# Patient Record
Sex: FEMALE | Race: BLACK OR AFRICAN AMERICAN | NOT HISPANIC OR LATINO | Employment: FULL TIME
[De-identification: names, ages, dates, MRNs, and addresses within clinical notes are randomized per-mention and may not be internally consistent; named-entity substitution may affect disease eponyms.]

---

## 2023-05-21 ENCOUNTER — HEALTH MAINTENANCE LETTER (OUTPATIENT)
Age: 33
End: 2023-05-21

## 2023-05-26 NOTE — PROGRESS NOTES
Assessment & Plan     Pelvic pain in female  See result note and below  - Wet preparation  - US Pelvic Transabdominal and Transvaginal; Future     Uterine leiomyoma, unspecified location  We reviewed possible etiologies of her pain. Discussed her previous history as best as she is able to describe. Would be beneficial to get previous records from clinic in [place], needs to confirm address of the clinic and will then complete MIKI.   Pelvic ultrasound scheduled for patient while at visit today, plan follow up once results available. Patient is given an opportunity to ask questions and have them answered.  - US Pelvic Transabdominal and Transvaginal; Future    Screening for malignant neoplasm of cervix  - Pap imaged thin layer screen with HPV - recommended age 30 - 65 years (select HPV order below)    Vaginitis and vulvovaginitis  - fluconazole (DIFLUCAN) 150 MG tablet; Take 1 tablet (150 mg) by mouth once for 1 dose Repeat in 1 week  - metroNIDAZOLE (FLAGYL) 500 MG tablet; Take 1 tablet (500 mg) by mouth 2 times daily for 7 days    NANDO Leung [facility] JOHANNA Veloz   Bertha is a 32 year old, presenting for the following health issues:  Pelvic Pain    Visit conducted with a telephone Yi interpretor.    HPI     Pelvic pain    Symptoms present x 1 month. Noted generalized in the lower quadrants R>L. Describes as intermittent with no noted aggravating factors. When present, describes as occasional sharper bouts with mostly dull to throbbing discomfort. Has not tried anything to alleviate the pain.   Denies fever, nausea, abnormal vaginal symptoms or urinary symptoms. No major bowel concerns, some occasional constipation. Does have a history of ovarian cysts in the past. Cycles are regular and last 5 days, last 2 cycles only bled 3 days. No prior pregnancy.  As exam was being done, questioned patient about any history of uterine fibroids as abdomen seemed firm and  "then she relayed that she had a previous large uterine fibroid. Believes it was just 1. Treated with an injection-believes it was Lupron in 2021. Had 5 or 6 monthly injections. This was in [place]. After treatment was put on a daily medication, believes it was an oral contraceptive pill. Has not been on that in over a year now. No records currently available. Unsure on last pap smear.    Review of Systems   Constitutional, HEENT, cardiovascular, pulmonary, gi and gu systems are negative, except as otherwise noted.      Objective    /82 (BP Location: Right arm, Patient Position: Sitting, Cuff Size: Adult Large)   Pulse 76   Ht 1.765 m (5' 9.5\")   Wt 106 kg (233 lb 9.6 oz)   LMP 05/06/2023 (Exact Date)   SpO2 100%   BMI 34.00 kg/m    Body mass index is 34 kg/m .  Physical Exam   GENERAL: healthy, alert and no distress  ABDOMEN: palpation of the abdomen is somewhat firm below the umbilicus. Tender with palpation across the lower abdomen, no guarding rigidity, rebound tenderness.   (female): normal female external genitalia, normal urethral meatus, vaginal mucosa pink and normal appearing cervix. Uterine enlargement to approximately 16-18 weeks size. Bilateral adnexal tenderness  MS: no gross musculoskeletal defects noted, no edema  SKIN: no suspicious lesions or rashes  PSYCH: mentation appears normal, affect normal/bright    Results for orders placed or performed in visit on 05/30/23 (from the past 24 hour(s))   Wet preparation    Specimen: Vagina; Swab   Result Value Ref Range    Trichomonas Absent Absent    Yeast Present (A) Absent    Clue Cells Present (A) Absent    WBCs/high power field 2+ (A) None           "

## 2023-05-30 ENCOUNTER — OFFICE VISIT (OUTPATIENT)
Dept: OBGYN | Facility: CLINIC | Age: 33
End: 2023-05-30
Payer: COMMERCIAL

## 2023-05-30 VITALS
DIASTOLIC BLOOD PRESSURE: 82 MMHG | OXYGEN SATURATION: 100 % | WEIGHT: 233.6 LBS | SYSTOLIC BLOOD PRESSURE: 126 MMHG | BODY MASS INDEX: 33.44 KG/M2 | HEART RATE: 76 BPM | HEIGHT: 70 IN

## 2023-05-30 DIAGNOSIS — Z12.4 SCREENING FOR MALIGNANT NEOPLASM OF CERVIX: ICD-10-CM

## 2023-05-30 DIAGNOSIS — R10.2 PELVIC PAIN IN FEMALE: Primary | ICD-10-CM

## 2023-05-30 DIAGNOSIS — D25.9 UTERINE LEIOMYOMA, UNSPECIFIED LOCATION: ICD-10-CM

## 2023-05-30 DIAGNOSIS — N76.0 VAGINITIS AND VULVOVAGINITIS: ICD-10-CM

## 2023-05-30 LAB
CLUE CELLS: PRESENT
TRICHOMONAS, WET PREP: ABNORMAL
WBC'S/HIGH POWER FIELD, WET PREP: ABNORMAL
YEAST, WET PREP: PRESENT

## 2023-05-30 PROCEDURE — 87210 SMEAR WET MOUNT SALINE/INK: CPT | Performed by: NURSE PRACTITIONER

## 2023-05-30 PROCEDURE — 87624 HPV HI-RISK TYP POOLED RSLT: CPT | Performed by: NURSE PRACTITIONER

## 2023-05-30 PROCEDURE — 99203 OFFICE O/P NEW LOW 30 MIN: CPT | Performed by: NURSE PRACTITIONER

## 2023-05-30 PROCEDURE — G0145 SCR C/V CYTO,THINLAYER,RESCR: HCPCS | Performed by: NURSE PRACTITIONER

## 2023-05-30 RX ORDER — FLUCONAZOLE 150 MG/1
150 TABLET ORAL ONCE
Qty: 2 TABLET | Refills: 0 | Status: SHIPPED | OUTPATIENT
Start: 2023-05-30 | End: 2023-05-30

## 2023-05-30 RX ORDER — METRONIDAZOLE 500 MG/1
500 TABLET ORAL 2 TIMES DAILY
Qty: 14 TABLET | Refills: 0 | Status: SHIPPED | OUTPATIENT
Start: 2023-05-30 | End: 2023-06-06

## 2023-05-30 NOTE — PATIENT INSTRUCTIONS
If you have any questions regarding your visit, Please contact your care team.     [facility]: [phone]  To Schedule an Appointment 24/7  Call: [phone] TE[facility]PH[facility] NUM[facility]     Ivania Najera- APRN CNP      Fariba Edwards-Surgery Scheduler  Yessica-Surgery Scheduler         Monday 7:30 am-5:00 pm    Tuesday 8:00 am-4:00 pm    Wednesday 7:30 am-4:00 pm    Thursday 8:00 am-11:00 am    Friday 7:30 am-4:00 pm [address] [id number] [phone] ask for [facility]  [phone] Fax    Imaging Scheduling all locations  [phone]    [facility] Labor and Delivery  [address] [place] [address]   [place] [address] [phone]         Urgent Care locations:  [facility]   Monday-Friday  10 am - 8 pm  Saturday and Sunday   9 am - 5 pm     [phone]   If you need a medication refill, please contact your pharmacy. Please allow 3 business days for your refill to be completed.  As always, Thank you for trusting us with your healthcare needs!      see additional instructions from your care team below

## 2023-05-31 ENCOUNTER — ANCILLARY PROCEDURE (OUTPATIENT)
Dept: ULTRASOUND IMAGING | Facility: CLINIC | Age: 33
End: 2023-05-31
Attending: NURSE PRACTITIONER
Payer: COMMERCIAL

## 2023-05-31 DIAGNOSIS — D25.9 UTERINE LEIOMYOMA, UNSPECIFIED LOCATION: ICD-10-CM

## 2023-05-31 DIAGNOSIS — R10.2 PELVIC PAIN IN FEMALE: ICD-10-CM

## 2023-05-31 PROCEDURE — 76830 TRANSVAGINAL US NON-OB: CPT | Mod: TC | Performed by: RADIOLOGY

## 2023-05-31 PROCEDURE — 76856 US EXAM PELVIC COMPLETE: CPT | Mod: TC | Performed by: RADIOLOGY

## 2023-06-01 LAB
BKR LAB AP GYN ADEQUACY: NORMAL
BKR LAB AP GYN INTERPRETATION: NORMAL
BKR LAB AP HPV REFLEX: NORMAL
BKR LAB AP PREVIOUS ABNORMAL: NORMAL
PATH REPORT.COMMENTS IMP SPEC: NORMAL
PATH REPORT.COMMENTS IMP SPEC: NORMAL
PATH REPORT.RELEVANT HX SPEC: NORMAL

## 2023-06-04 LAB
HUMAN PAPILLOMA VIRUS 16 DNA: NEGATIVE
HUMAN PAPILLOMA VIRUS 18 DNA: NEGATIVE
HUMAN PAPILLOMA VIRUS FINAL DIAGNOSIS: ABNORMAL
HUMAN PAPILLOMA VIRUS OTHER HR: POSITIVE

## 2023-06-05 ENCOUNTER — PATIENT OUTREACH (OUTPATIENT)
Dept: OBGYN | Facility: CLINIC | Age: 33
End: 2023-06-05
Payer: COMMERCIAL

## 2023-06-05 DIAGNOSIS — R87.810 CERVICAL HIGH RISK HPV (HUMAN PAPILLOMAVIRUS) TEST POSITIVE: ICD-10-CM

## 2023-06-15 ENCOUNTER — OFFICE VISIT (OUTPATIENT)
Dept: OBGYN | Facility: CLINIC | Age: 33
End: 2023-06-15
Payer: COMMERCIAL

## 2023-06-15 VITALS
OXYGEN SATURATION: 99 % | WEIGHT: 232.2 LBS | HEART RATE: 86 BPM | BODY MASS INDEX: 33.8 KG/M2 | SYSTOLIC BLOOD PRESSURE: 133 MMHG | DIASTOLIC BLOOD PRESSURE: 86 MMHG

## 2023-06-15 DIAGNOSIS — D25.9 UTERINE LEIOMYOMA, UNSPECIFIED LOCATION: Primary | ICD-10-CM

## 2023-06-15 PROCEDURE — 99215 OFFICE O/P EST HI 40 MIN: CPT | Performed by: OBSTETRICS & GYNECOLOGY

## 2023-06-15 NOTE — PROGRESS NOTES
Bertha is a 32 year old   is here today in follow up from her ultrasound.    She is seen with a [place]ish . She was originally seen complaining in pelvic pressure and pain.  She was found to have an enlarged uterus.  She reports receiving treatment in [place] for her fibroids.  She was treated with Lupron for 6 months, but with no significant reduction in fibroid (uterine) size.  After that treatment, she was put on an ORAL CONTRACEPTIVE PILLS, but she hasn't taken that for several months.  She does report normal cycles.  She does want to conceive and has been off birth control without success..       ROS: Pertinent ROS as above.    Gyn Hx:      Past Medical History:   Diagnosis Date     No pertinent past medical history      Past Surgical History:   Procedure Laterality Date     NO HISTORY OF SURGERY           ALL/Meds: Her medication and allergy histories were reviewed and are documented in their appropriate chart areas.    SH: Reviewed and documented in the appropriate area of the chart.  FH:  Her family history is reviewed and updated in the chart, today.  PMH: Her past medical, surgical, and obstetric histories were reviewed and updated today in the appropriate chart areas.    PE: /86 (BP Location: Left arm, Patient Position: Sitting, Cuff Size: Adult Large)   Pulse 86   Wt 105.3 kg (232 lb 3.2 oz)   LMP 2023 (Exact Date)   SpO2 99%   BMI 33.80 kg/m    Body mass index is 33.8 kg/m .    Pertinent Physical exam findings:    General Appearance:  healthy, alert, active, no distress      U/S:  UTERUS: Prominently enlarged fibroid uterus. The uterus measures 20.7  x 15.5 x 10.3 cm. Numerous prominent uterine fibroids are noted, with  the largest on the left measuring 11.7 cm.     ENDOMETRIUM: 8 mm. Normal smooth endometrium.     RIGHT OVARY: Not visualized, likely obscured by the fibroid uterus.      LEFT OVARY: Not visualized, likely obscured by the fibroid uterus.      No  significant free fluid.                                                                      IMPRESSION:  1.  Prominently enlarged fibroid uterus.  2.  Visualization of the pelvic structures was quite limited due to  the presence of numerous large fibroids.  3.  Nonvisualization of the ovaries.    I discussed fibroids.  We discussed their origin, the fact that while they are benign, they do tend to grow slowly in response to estrogen.  We discussed the normal resolution that gradually occurs after menopause.  I explained that fibroids are very common and in many cases do not cause symptoms.  We discussed these symptoms, including menorrhagia, metrorrhagia, dysmenorrhea as well as the mass effect that fibroids can cause.  We discussed options for treatment.  These include conservative observation, symptomatic hormonal control, myomectomy, uterine artery embolization, and hysterectomy.  We discussed the RISKS, BENEFITS, AND ALTERNATIVES of each of these options.  This includes the risk of post-procedure pain, passage of a necrosed fibroid and the need for post embolization hysterectomy.       A/P:(D25.9) Uterine leiomyoma, unspecified location  (primary encounter diagnosis)  Comment: 45 minutes spent on the date of the encounter doing chart review, review of test results, patient visit and documentation   Plan: She wants to consider robotic vs open myomectomy.  I will forward her chart to Dr. Griffin for consultation.             - No orders of the defined types were placed in this encounter.

## 2023-06-15 NOTE — Clinical Note
Amarilys, I think she needs a myomectomy, but she wants to try a robotic myomectomy as opposed to an open myomectomy. I indicated that you may want to do a virtual consultation or an in person examination before making a decision and she is fine with that.  She is a [place] speaker. Olvin

## 2023-06-15 NOTE — PATIENT INSTRUCTIONS
If you have any questions regarding your visit, Please contact your care team.     [facility]: [phone]    To Schedule an Appointment 24/7  Call: [phone] HOURS TELEPHONE NUMBER     Jermaine Moy MD  Medical Director    Glendy Handy-JO ANN Edwards-Surgery Scheduler  Yessica-Surgery Scheduler               Tuesday-[place]  7:30 a.m-4:30 p.m    Thursday-[place]  7:30 a.m-4:30 p.m    Typical Surgery Days: Tuesday or Friday [facility] [place]  [address] [place] [address]  PH: [phone]     Imaging Scheduling all locations  PH: [phone]     [facility] Labor and Delivery  [address] [place] [address] [place] [address]  PH: [phone]    [facility]  [address] [place] [address]  PH: [phone] Fax      **Surgeries** Our Surgery Schedulers will contact you to schedule. If you do not receive a call within 3 business days, please call [phone].    Urgent Care locations:  [facility] Monday-Friday  10 am - 8 pm  Saturday and Sunday   9 am - 5 pm  Monday-Friday   10 am - 8 pm  Saturday and Sunday   9 am - 5 pm   [phone]   If you need a medication refill, please contact your pharmacy. Please allow 3 business days for your refill to be completed.  As always, Thank you for trusting us with your healthcare needs!    see additional instructions from your care team below

## 2023-06-16 ENCOUNTER — TELEPHONE (OUTPATIENT)
Dept: OBGYN | Facility: CLINIC | Age: 33
End: 2023-06-16
Payer: COMMERCIAL

## 2023-06-16 NOTE — TELEPHONE ENCOUNTER
Attempted to contact patient - phone rang once and hung up x2. Unable to leave VM.    If patient calls back, please see message below and assist in scheduling.  Aysha Guevara, CMA

## 2023-06-16 NOTE — TELEPHONE ENCOUNTER
"CC'd chart: \"Please reach out to patient to assist with scheduling an in-person consult. If booking out further than desired by patient, please reach out to see where we can fit her in.     Thank you!   Amarilys Griffin, DO\"    "

## 2023-07-12 NOTE — PATIENT INSTRUCTIONS
If you have any questions regarding your visit, Please contact your care team.    [facility]: [phone]      [facility] CLINIC HOURS TELEPHONE NUMBER   Amarilys Griffin DO.    JOSE Marrero-Surgery Scheduler  Yessica - Surgery Scheduler    JO ANN Handy, JO ANN Beard RN     Monday, Thursday  [place]  7am-3pm    Tuesday, Wednesday  [place]  7am-3pm    E/O Friday &   [place]    Typical Surgery Days: Thursday or Friday   [facility]  [address] [place] [address] [phone] Phone  [phone] Fax    [address] [address] [phone] Phone    Imaging [phone] Phone    [facility] Labor and Delivery:  [phone] Phone     **Surgeries** Our Surgery Schedulers will contact you to schedule. If you do not receive a call within 3 business days, please call [phone].    Urgent Care locations:  [facility] Saturday and    9 am - 5 pm    Monday-Friday   12 pm - 8 pm  Saturday and    9 am - 5 pm   [phone]       If you need a medication refill, please contact your pharmacy. Please allow 3 business days for your refill to be completed.  As always, Thank you for trusting us with your healthcare needs!

## 2023-07-18 ENCOUNTER — OFFICE VISIT (OUTPATIENT)
Dept: OBGYN | Facility: CLINIC | Age: 33
End: 2023-07-18
Payer: COMMERCIAL

## 2023-07-18 VITALS
HEART RATE: 87 BPM | WEIGHT: 232.2 LBS | DIASTOLIC BLOOD PRESSURE: 79 MMHG | OXYGEN SATURATION: 99 % | BODY MASS INDEX: 33.8 KG/M2 | SYSTOLIC BLOOD PRESSURE: 126 MMHG

## 2023-07-18 DIAGNOSIS — Z91.89 AT RISK FOR FERTILITY PROBLEMS: ICD-10-CM

## 2023-07-18 DIAGNOSIS — R10.2 PELVIC PAIN IN FEMALE: ICD-10-CM

## 2023-07-18 DIAGNOSIS — D25.9 UTERINE LEIOMYOMA, UNSPECIFIED LOCATION: Primary | ICD-10-CM

## 2023-07-18 PROCEDURE — 99215 OFFICE O/P EST HI 40 MIN: CPT | Performed by: OBSTETRICS & GYNECOLOGY

## 2023-07-18 NOTE — PROGRESS NOTES
SUBJECTIVE:       HPI: Bertha Willard is a 32 year old  who presents today for presents today for consultation regarding fibroid management options, possible myomectomy, referral from Dr. Moy.    Patient was seen by Dr. Moy on 6/15 for symptomatic fibroids, desiring fertility. Main symptoms include pelvic pressure and pain. She has previously been treated with Lupron for 6 months without improvement in fibroid size. She was then placed on OCPs but has not taken for >12 months. Patient was counseled extensively on fibroids and management options in the setting of fertility planning. She requested consultation to discuss myomectomy options further.Has been trying to concieve since .  Partner has never fathered any children. He has never had a semen analysis.     Menses every 28 days, lasting 3-5 days. Flow - reg/light  Patient is sexually active. One male partner.    Denies dysuria, hematuria, diarrhea. +constipation, not taking medications.        Gyn Hx: Patient's last menstrual period was 2023 (exact date).     Last pap was 2023 NIL HR HPV Other  Family history of gyn-related malignancies: denies         reports that she has never smoked. She has never used smokeless tobacco.      Today's PHQ-2 Score:       6/15/2023     8:00 AM   PHQ-2 (  [employer])   Q1: Little interest or pleasure in doing things 0   Q2: Feeling down, depressed or hopeless 0   PHQ-2 Score 0   Q1: Little interest or pleasure in doing things Not at all   Q2: Feeling down, depressed or hopeless Not at all   PHQ-2 Score 0     Today's PHQ-9 Score:        No data to display              Today's VIJAY-7 Score:        No data to display                Problem list and histories reviewed & adjusted, as indicated.  Additional history: as documented.    Patient Active Problem List   Diagnosis     Cervical high risk HPV (human papillomavirus) test positive     Uterine leiomyoma, unspecified location     Past Surgical History:    Procedure Laterality Date     NO HISTORY OF SURGERY        Social History     Tobacco Use     Smoking status: Never     Smokeless tobacco: Never   Substance Use Topics     Alcohol use: Never      No data available            No current outpatient medications on file prior to visit.  No current facility-administered medications on file prior to visit.    No Known Allergies    ROS:  10 Point review of systems negative other noted above in HPI    OBJECTIVE:     /79 (BP Location: Left arm, Patient Position: Sitting, Cuff Size: Adult Large)   Pulse 87   Wt 105.3 kg (232 lb 3.2 oz)   LMP 06/27/2023 (Exact Date)   SpO2 99%   BMI 33.80 kg/m    Body mass index is 33.8 kg/m .      Gen: Alert, oriented, appropriately interactive, NAD  Eyes: Eyes grossly normal to inspection, conjunctivae and sclerae normal  Resp: no audible wheeze, cough, or visible cyanosis.  No visible retractions or increased work of breathing.  Able to speak fully in complete sentences.  Neuro: Cranial nerves grossly intact, mentation intact and speech normal  Psych: mentation appears normal, affect normal/bright, judgement and insight intact, normal speech and appearance well-groomed        In-Clinic Test Results:  No results found for this or any previous visit (from the past 24 hour(s)).   Results for orders placed or performed in visit on 05/31/23   US Pelvic Transabdominal and Transvaginal    Narrative    US PELVIC TRANSABDOMINAL AND TRANSVAGINAL 5/31/2023 9:07 AM    CLINICAL HISTORY: Uterine leiomyoma. Pelvic pain.    TECHNIQUE: Transabdominal scans were performed. Endovaginal ultrasound  was performed, but endovaginal visualization was quite limited due to  the presence of multiple large fibroids.    COMPARISON: None.    FINDINGS:    UTERUS: Prominently enlarged fibroid uterus. The uterus measures 20.7  x 15.5 x 10.3 cm. Numerous prominent uterine fibroids are noted, with  the largest on the left measuring 11.7 cm.    ENDOMETRIUM: 8 mm.  Normal smooth endometrium.    RIGHT OVARY: Not visualized, likely obscured by the fibroid uterus.     LEFT OVARY: Not visualized, likely obscured by the fibroid uterus.     No significant free fluid.      Impression    IMPRESSION:  1.  Prominently enlarged fibroid uterus.  2.  Visualization of the pelvic structures was quite limited due to  the presence of numerous large fibroids.  3.  Nonvisualization of the ovaries.      DANIEL KAISER MD         SYSTEM ID:  [id number]          ASSESSMENT/PLAN:                                                      Bertha Willard is a 32 year old  who presents today for  consultation regarding fibroid management options, possible myomectomy, referral from Dr. Moy.        ICD-10-CM    1. Uterine leiomyoma, unspecified location  D25.9 MR Pelvis (GYN) wo & w Contrast      2. Pelvic pain in female  R10.2 MR Pelvis (GYN) wo & w Contrast      3. At risk for fertility problems  Z91.89 MR Pelvis (GYN) wo & w Contrast          Large uterine fibroids on pelvic ultrasound. Recommend pelvic MRI at this time to better characterize fibroids for improved surgery or other intervention planning. Options for management of fibroids reviewed extensively, in detail, including medical and surgical options. Specifically, we focused on Acessa, [place] and myomectomy (open and robotic). Fibroid surgery and future pregnancy planning also reviewed, including likely need for early  section depending on procedure chosen. Fertility and risk of impairing fertility based on each procedure also reviewed extensively.  I am not sure at this time if a robotic or open approach to surgery is best without further imaging, however suspect significant distortion of cavity if either performed in event myomectomy if chosen route. After discussing RBA, patient would like to go forward with MRI as recommended and short follow-up to review images further.      I personally reviewed her recent CNP and OBGYN visit  notes, pelvic ultrasound.    55 minutes spent on the date of the encounter doing chart review, history and exam, documentation and further activities as noted above    Visit completed today with  services.    Amarilys Griffin DO  [facility]

## 2023-08-14 ENCOUNTER — ANCILLARY PROCEDURE (OUTPATIENT)
Dept: MRI IMAGING | Facility: CLINIC | Age: 33
End: 2023-08-14
Attending: OBSTETRICS & GYNECOLOGY
Payer: COMMERCIAL

## 2023-08-14 DIAGNOSIS — D25.9 UTERINE LEIOMYOMA, UNSPECIFIED LOCATION: ICD-10-CM

## 2023-08-14 DIAGNOSIS — Z91.89 AT RISK FOR FERTILITY PROBLEMS: ICD-10-CM

## 2023-08-14 DIAGNOSIS — R10.2 PELVIC PAIN IN FEMALE: ICD-10-CM

## 2023-08-14 PROCEDURE — 72197 MRI PELVIS W/O & W/DYE: CPT | Mod: TC | Performed by: STUDENT IN AN ORGANIZED HEALTH CARE EDUCATION/TRAINING PROGRAM

## 2023-08-14 PROCEDURE — A9585 GADOBUTROL INJECTION: HCPCS | Mod: JZ | Performed by: STUDENT IN AN ORGANIZED HEALTH CARE EDUCATION/TRAINING PROGRAM

## 2023-08-14 RX ORDER — GADOBUTROL 604.72 MG/ML
10 INJECTION INTRAVENOUS ONCE
Status: COMPLETED | OUTPATIENT
Start: 2023-08-14 | End: 2023-08-14

## 2023-08-14 RX ADMIN — GADOBUTROL 10 ML: 604.72 INJECTION INTRAVENOUS at 09:45

## 2023-09-19 NOTE — PATIENT INSTRUCTIONS
If you have any questions regarding your visit, Please contact your care team.    [facility]: [phone]      [facility] CLINIC HOURS TELEPHONE NUMBER   Amarilys Griffin DO.    JOSE Marrero-Surgery Scheduler  Yessica - Surgery Scheduler    JO ANN Handy, JO ANN Beard RN     Monday, Thursday  [place]  7am-3pm    Tuesday, Wednesday  [place]  7am-3pm    E/O Friday &   [place]    Typical Surgery Days: Thursday or Friday   [facility]  [address] [place] [address] [phone] Phone  [phone] Fax    [address] [address] [phone] Phone    Imaging [phone] Phone    [facility] Labor and Delivery:  [phone] Phone     **Surgeries** Our Surgery Schedulers will contact you to schedule. If you do not receive a call within 3 business days, please call [phone].    Urgent Care locations:  [facility] Saturday and    9 am - 5 pm    Monday-Friday   12 pm - 8 pm  Saturday and    9 am - 5 pm   [phone]       If you need a medication refill, please contact your pharmacy. Please allow 3 business days for your refill to be completed.  As always, Thank you for trusting us with your healthcare needs!

## 2023-09-20 ENCOUNTER — OFFICE VISIT (OUTPATIENT)
Dept: OBGYN | Facility: CLINIC | Age: 33
End: 2023-09-20
Payer: COMMERCIAL

## 2023-09-20 VITALS
OXYGEN SATURATION: 100 % | BODY MASS INDEX: 34.03 KG/M2 | SYSTOLIC BLOOD PRESSURE: 129 MMHG | WEIGHT: 233.8 LBS | DIASTOLIC BLOOD PRESSURE: 78 MMHG | HEART RATE: 97 BPM

## 2023-09-20 DIAGNOSIS — K59.00 CONSTIPATION, UNSPECIFIED CONSTIPATION TYPE: ICD-10-CM

## 2023-09-20 DIAGNOSIS — Z91.89 AT RISK FOR FERTILITY PROBLEMS: ICD-10-CM

## 2023-09-20 DIAGNOSIS — R10.2 PELVIC PAIN IN FEMALE: ICD-10-CM

## 2023-09-20 DIAGNOSIS — D25.2 FIBROIDS, SUBSEROUS: Primary | ICD-10-CM

## 2023-09-20 PROCEDURE — 99215 OFFICE O/P EST HI 40 MIN: CPT | Performed by: OBSTETRICS & GYNECOLOGY

## 2023-09-20 RX ORDER — BISACODYL 5 MG/1
5 TABLET, DELAYED RELEASE ORAL DAILY PRN
Qty: 60 TABLET | Refills: 1 | Status: SHIPPED | OUTPATIENT
Start: 2023-09-20

## 2023-09-20 RX ORDER — SENNA AND DOCUSATE SODIUM 50; 8.6 MG/1; MG/1
1 TABLET, FILM COATED ORAL AT BEDTIME
Qty: 60 TABLET | Refills: 0 | Status: SHIPPED | OUTPATIENT
Start: 2023-09-20

## 2023-09-20 NOTE — PROGRESS NOTES
SUBJECTIVE:       HPI: Bertha Willard is a 32 year old  who presents today for presents today for consultation regarding fibroid management options, MRI Follow-up.    No changes since last visit other than some constipation.     From last visit:  Patient was seen by Dr. Moy on 6/15 for symptomatic fibroids, desiring fertility. Main symptoms include pelvic pressure and pain. She has previously been treated with Lupron for 6 months without improvement in fibroid size. She was then placed on OCPs but has not taken for >12 months. Patient was counseled extensively on fibroids and management options in the setting of fertility planning. She requested consultation to discuss myomectomy options further.Has been trying to concieve since .  Partner has never fathered any children. He has never had a semen analysis.     Menses every 28 days, lasting 3-5 days. Flow - reg/light  Patient is sexually active. One male partner.      Gyn Hx: Patient's last menstrual period was 2023 (exact date).     Last pap was 2023 NIL HR HPV Other  Family history of gyn-related malignancies: denies         reports that she has never smoked. She has never used smokeless tobacco.      Today's PHQ-2 Score:       6/15/2023     8:00 AM   PHQ-2 (  [employer])   Q1: Little interest or pleasure in doing things 0   Q2: Feeling down, depressed or hopeless 0   PHQ-2 Score 0   Q1: Little interest or pleasure in doing things Not at all   Q2: Feeling down, depressed or hopeless Not at all   PHQ-2 Score 0     Today's PHQ-9 Score:        No data to display              Today's VIJAY-7 Score:        No data to display                Problem list and histories reviewed & adjusted, as indicated.  Additional history: as documented.    Patient Active Problem List   Diagnosis    Cervical high risk HPV (human papillomavirus) test positive    Uterine leiomyoma, unspecified location     Past Surgical History:   Procedure Laterality Date    NO  HISTORY OF SURGERY        Social History     Tobacco Use    Smoking status: Never    Smokeless tobacco: Never   Substance Use Topics    Alcohol use: Never      No data available              No current outpatient medications on file prior to visit.  No current facility-administered medications on file prior to visit.    No Known Allergies    ROS:  10 Point review of systems negative other noted above in HPI    OBJECTIVE:     /78 (BP Location: Left arm, Patient Position: Chair, Cuff Size: Adult Regular)   Pulse 97   Wt 106.1 kg (233 lb 12.8 oz)   LMP 09/19/2023 (Exact Date)   SpO2 100%   BMI 34.03 kg/m    Body mass index is 34.03 kg/m .      Gen: Alert, oriented, appropriately interactive, NAD  Resp: no audible wheeze, cough, or visible cyanosis.  No visible retractions or increased work of breathing.  Able to speak fully in complete sentences.  Psych: mentation appears normal, affect normal/bright, judgement and insight intact, normal speech and appearance well-groomed        In-Clinic Test Results:  No results found for this or any previous visit (from the past 24 hour(s)).   Results for orders placed or performed in visit on 05/31/23   US Pelvic Transabdominal and Transvaginal    Narrative    US PELVIC TRANSABDOMINAL AND TRANSVAGINAL 5/31/2023 9:07 AM    CLINICAL HISTORY: Uterine leiomyoma. Pelvic pain.    TECHNIQUE: Transabdominal scans were performed. Endovaginal ultrasound  was performed, but endovaginal visualization was quite limited due to  the presence of multiple large fibroids.    COMPARISON: None.    FINDINGS:    UTERUS: Prominently enlarged fibroid uterus. The uterus measures 20.7  x 15.5 x 10.3 cm. Numerous prominent uterine fibroids are noted, with  the largest on the left measuring 11.7 cm.    ENDOMETRIUM: 8 mm. Normal smooth endometrium.    RIGHT OVARY: Not visualized, likely obscured by the fibroid uterus.     LEFT OVARY: Not visualized, likely obscured by the fibroid uterus.     No  significant free fluid.      Impression    IMPRESSION:  1.  Prominently enlarged fibroid uterus.  2.  Visualization of the pelvic structures was quite limited due to  the presence of numerous large fibroids.  3.  Nonvisualization of the ovaries.      DANIEL KAISER MD         SYSTEM ID:  [id number]        Results for orders placed or performed in visit on 08/14/23   MR Pelvis (GYN) wo & w Contrast    Narrative    MRI PELVIS WITH AND WITHOUT CONTRAST  8/14/2023 10:05 AM     HISTORY: Fibroids. Surgery planning. Pelvic pain.     COMPARISON: Pelvic ultrasound 5/31/2023.     TECHNIQUE: Multiplanar multisequence imaging of the pelvis acquired  before and after administration of 10 mL Gadavist intravenous  contrast.    FINDINGS:   Bulky and enlarged uterus measuring approximately 23 x 9.3 x 18.5 cm  with multiple intramural, and subserosal fibroids. For example on  series 2:  -Left uterine fundus, subserosal, 9.0 x 11.4 x 12.9 cm, #37  -Right lower effacing the cervix to the left, subserosal, 7.0 x 6.2 x  7 cm, image 15    -None of the fibroids demonstrate suspicious features. Mild tethering  of the posterior uterus to the anterior high rectum with small amount  of surrounding fluid.    Endometrium thickness measures approximately 7 mm and is effaced by  the multiple fibroids. No obvious endometrial lesion.    Ovaries are somewhat posterior and medially displaced. Right ovary is  normal in size and contains a punctate T1 hyperintense structure. Left  ovary measures 4.5 x 4.3 x 6.3 cm and contains a simple appearing 3.4  cm cyst, and additional small adjacent T1 hyperintense lesions  measuring up to 0.9 cm. Additional T1 hyperintense foci along the  anterior aspect of the rectum (10/#28) are not definitively within the  ovary.     No adenopathy in the pelvis. Pelvic vasculature is patent. No free  fluid. No worrisome osseous signal. Small amount of free fluid.      Impression    IMPRESSION:     1. Enlarged, fibroid uterus,  the largest subserosal fibroid measuring  12.9 cm.    2. Slight apparent tethering of the ovaries, posterior uterus and  anterior rectum, which is nonspecific, however could be related to  prior infectious/inflammatory process, prior surgery or underlying  endometriosis. A few T1 hyperintense foci within the posterior pelvis,  not definitively within the ovary could reflect small endometriotic  deposits. Clinical correlation is recommended.     3. Left ovarian benign cyst and additional small hemorrhagic foci that  could reflect hemorrhagic cysts or endometriomas.    4. Small volume pelvic free fluid.    ANAY HOOD MD         SYSTEM ID:  [id number]        ASSESSMENT/PLAN:                                                      Bertha Willard is a 32 year old  who presents today for follow-up fibroid management        ICD-10-CM    1. Fibroids, subserous  D25.2       2. Pelvic pain in female  R10.2       3. At risk for fertility problems  Z91.89       4. Constipation, unspecified constipation type  K59.00 bisacodyl (DULCOLAX) 5 MG EC tablet     SENNA-docusate sodium (SENNA S) 8.6-50 MG tablet            Large uterine fibroids confirmed on MRI, more extensive than previously thought. Largest fibroid 12.9cm not subserosal. Options for management of fibroids reviewed extensively, in detail, including medical and surgical options. Specifically, we focused on Acessa, UAE and myomectomy. Fibroid surgery and future pregnancy planning also reviewed, including likely need for early  section depending on procedure chosen. Fertility and risk of impairing fertility based on each procedure also reviewed extensively.  Of note, due to the size of her uterus, I discussed that she would likely need a vertical skin incision for appropriate access to her uterus and fibroids. Details of the procedure were discussed with the patient.  Risks include, but are not limited to, bleeding, infection, and injury to  surrounding organs such as the bowel, urinary system, nerves, and blood vessels.  Injury may result in repair at the time of the surgery or in a separate procedure.   After discussing her options, including option for second opinion, Bertha would like to discuss her options further with her partner and will let us know.      Bertha requested medications for constipation today. We discussed that a large part of her constipation is from bulk affect from her large uterus and fibroids. Rx sent as requested but surgical or interventional interventions will also be necessary in the future.    60 minutes spent on the date of the encounter doing chart review, history and exam, documentation and further activities as noted above    Visit completed today with  services.    Amarilys Griffin, [facility]

## 2023-11-24 NOTE — PATIENT INSTRUCTIONS
If you have any questions regarding your visit, Please contact your care team.    [facility] [facility]: [phone]      Women [facility] CLINIC HOURS TELEPHONE NUMBER   Amarilys Griffin DO.    JOSE Marrero-Surgery Scheduler  Yessica - Surgery Scheduler    JO ANN Handy, JO ANN Beard RN     Monday, Thursday  [place]  7am-3pm    Tuesday, Wednesday  [place]  7am-3pm    E/O Friday &   [place]    Typical Surgery Days: Thursday or Friday   [facility]  [address]  [place], [place] [address] [phone] Phone  [phone] Fax    [facility]  [address] [place] [address] [phone] Phone    Imaging [phone] Phone    [facility] Labor and Delivery:  [phone] Phone     **Surgeries** Our Surgery Schedulers will contact you to schedule. If you do not receive a call within 3 business days, please call [phone].    Urgent Care locations:  [facility] Saturday and    9 am - 5 pm    Monday-Friday   12 pm - 8 pm  Saturday and    9 am - 5 pm   [phone]       If you need a medication refill, please contact your pharmacy. Please allow 3 business days for your refill to be completed.  As always, Thank you for trusting us with your healthcare needs!     Statement Selected

## 2023-11-29 ENCOUNTER — TELEPHONE (OUTPATIENT)
Dept: OBGYN | Facility: CLINIC | Age: 33
End: 2023-11-29

## 2023-11-29 ENCOUNTER — OFFICE VISIT (OUTPATIENT)
Dept: OBGYN | Facility: CLINIC | Age: 33
End: 2023-11-29
Payer: COMMERCIAL

## 2023-11-29 VITALS
HEART RATE: 83 BPM | BODY MASS INDEX: 34.21 KG/M2 | DIASTOLIC BLOOD PRESSURE: 86 MMHG | SYSTOLIC BLOOD PRESSURE: 128 MMHG | WEIGHT: 235 LBS

## 2023-11-29 DIAGNOSIS — D25.2 FIBROIDS, SUBSEROUS: Primary | ICD-10-CM

## 2023-11-29 DIAGNOSIS — R10.2 PELVIC PAIN IN FEMALE: ICD-10-CM

## 2023-11-29 PROCEDURE — 99213 OFFICE O/P EST LOW 20 MIN: CPT | Mod: 57 | Performed by: OBSTETRICS & GYNECOLOGY

## 2023-11-29 RX ORDER — POLYETHYLENE GLYCOL 3350 17 G/17G
17 POWDER, FOR SOLUTION ORAL
COMMUNITY

## 2023-11-29 NOTE — PROGRESS NOTES
SUBJECTIVE:       HPI: Bertha Willard is a 32 year old  who presents today for fibroid follow-up management options    No changes since last visit.  Requesting surgery (myomectomy) as discussed at her last visit.  No further questions.    From prior visit:  Counseled at last two visits regarding fibroid management options in the setting of pregnancy planning. Patient given printed information and to let us know what she would like to do going forward.  Patient was seen by Dr. Moy on 6/15 for symptomatic fibroids, desiring fertility. Main symptoms include pelvic pressure and pain. She has previously been treated with Lupron for 6 months without improvement in fibroid size. She was then placed on OCPs but has not taken for >12 months. Patient was counseled extensively on fibroids and management options in the setting of fertility planning. She requested consultation to discuss myomectomy options further.Has been trying to concieve since .  Partner has never fathered any children. He has never had a semen analysis.     Menses every 28 days, lasting 3-5 days. Flow - reg/light  Patient is sexually active. One male partner.      Gyn Hx: Patient's last menstrual period was 2023.     Last pap was 2023 NIL HR HPV Other  Family history of gyn-related malignancies: denies         reports that she has never smoked. She has never used smokeless tobacco.      Today's PHQ-2 Score:       6/15/2023     8:00 AM   PHQ-2 (  [employer])   Q1: Little interest or pleasure in doing things 0   Q2: Feeling down, depressed or hopeless 0   PHQ-2 Score 0   Q1: Little interest or pleasure in doing things Not at all   Q2: Feeling down, depressed or hopeless Not at all   PHQ-2 Score 0     Today's PHQ-9 Score:        No data to display              Today's VIJAY-7 Score:        No data to display                Problem list and histories reviewed & adjusted, as indicated.  Additional history: as documented.    Patient Active  Problem List   Diagnosis    Cervical high risk HPV (human papillomavirus) test positive    Uterine leiomyoma, unspecified location     Past Surgical History:   Procedure Laterality Date    NO HISTORY OF SURGERY        Social History     Tobacco Use    Smoking status: Never    Smokeless tobacco: Never   Substance Use Topics    Alcohol use: Never      No data available              bisacodyl (DULCOLAX) 5 MG EC tablet, Take 1 tablet (5 mg) by mouth daily as needed for constipation  polyethylene glycol (MIRALAX) 17 g packet, Take 17 g by mouth  SENNA-docusate sodium (SENNA S) 8.6-50 MG tablet, Take 1 tablet by mouth At Bedtime    No current facility-administered medications on file prior to visit.    No Known Allergies    ROS:  10 Point review of systems negative other noted above in HPI    OBJECTIVE:     /86   Pulse 83   Wt 106.6 kg (235 lb)   LMP 11/14/2023   BMI 34.21 kg/m    Body mass index is 34.21 kg/m .      Gen: Alert, oriented, appropriately interactive, NAD  Resp: no audible wheeze, cough, or visible cyanosis.  No visible retractions or increased work of breathing.  Able to speak fully in complete sentences.  Psych: mentation appears normal, affect normal/bright, judgement and insight intact, normal speech and appearance well-groomed        In-Clinic Test Results:  No results found for this or any previous visit (from the past 24 hour(s)).   Results for orders placed or performed in visit on 05/31/23   US Pelvic Transabdominal and Transvaginal    Narrative    US PELVIC TRANSABDOMINAL AND TRANSVAGINAL 5/31/2023 9:07 AM    CLINICAL HISTORY: Uterine leiomyoma. Pelvic pain.    TECHNIQUE: Transabdominal scans were performed. Endovaginal ultrasound  was performed, but endovaginal visualization was quite limited due to  the presence of multiple large fibroids.    COMPARISON: None.    FINDINGS:    UTERUS: Prominently enlarged fibroid uterus. The uterus measures 20.7  x 15.5 x 10.3 cm. Numerous prominent  uterine fibroids are noted, with  the largest on the left measuring 11.7 cm.    ENDOMETRIUM: 8 mm. Normal smooth endometrium.    RIGHT OVARY: Not visualized, likely obscured by the fibroid uterus.     LEFT OVARY: Not visualized, likely obscured by the fibroid uterus.     No significant free fluid.      Impression    IMPRESSION:  1.  Prominently enlarged fibroid uterus.  2.  Visualization of the pelvic structures was quite limited due to  the presence of numerous large fibroids.  3.  Nonvisualization of the ovaries.      DANIEL KAISER MD         SYSTEM ID:  [id number]        Results for orders placed or performed in visit on 08/14/23   MR Pelvis (GYN) wo & w Contrast    Narrative    MRI PELVIS WITH AND WITHOUT CONTRAST  8/14/2023 10:05 AM     HISTORY: Fibroids. Surgery planning. Pelvic pain.     COMPARISON: Pelvic ultrasound 5/31/2023.     TECHNIQUE: Multiplanar multisequence imaging of the pelvis acquired  before and after administration of 10 mL Gadavist intravenous  contrast.    FINDINGS:   Bulky and enlarged uterus measuring approximately 23 x 9.3 x 18.5 cm  with multiple intramural, and subserosal fibroids. For example on  series 2:  -Left uterine fundus, subserosal, 9.0 x 11.4 x 12.9 cm, #37  -Right lower effacing the cervix to the left, subserosal, 7.0 x 6.2 x  7 cm, image 15    -None of the fibroids demonstrate suspicious features. Mild tethering  of the posterior uterus to the anterior high rectum with small amount  of surrounding fluid.    Endometrium thickness measures approximately 7 mm and is effaced by  the multiple fibroids. No obvious endometrial lesion.    Ovaries are somewhat posterior and medially displaced. Right ovary is  normal in size and contains a punctate T1 hyperintense structure. Left  ovary measures 4.5 x 4.3 x 6.3 cm and contains a simple appearing 3.4  cm cyst, and additional small adjacent T1 hyperintense lesions  measuring up to 0.9 cm. Additional T1 hyperintense foci along  the  anterior aspect of the rectum (10/#28) are not definitively within the  ovary.     No adenopathy in the pelvis. Pelvic vasculature is patent. No free  fluid. No worrisome osseous signal. Small amount of free fluid.      Impression    IMPRESSION:     1. Enlarged, fibroid uterus, the largest subserosal fibroid measuring  12.9 cm.    2. Slight apparent tethering of the ovaries, posterior uterus and  anterior rectum, which is nonspecific, however could be related to  prior infectious/inflammatory process, prior surgery or underlying  endometriosis. A few T1 hyperintense foci within the posterior pelvis,  not definitively within the ovary could reflect small endometriotic  deposits. Clinical correlation is recommended.     3. Left ovarian benign cyst and additional small hemorrhagic foci that  could reflect hemorrhagic cysts or endometriomas.    4. Small volume pelvic free fluid.    ANAY HOOD MD         SYSTEM ID:  [id number]        ASSESSMENT/PLAN:                                                      Bertha Willard is a 32 year old  who presents today for follow-up fibroid management        ICD-10-CM    1. Fibroids, subserous  D25.2       2. Pelvic pain in female  R10.2           Large uterine fibroids confirmed on MRI. Largest fibroid 12.9cm not subserosal. Options for management of fibroids previously reviewed extensively, in detail, including medical and surgical options. Specifically, we focused on Acessa, UAE and myomectomy. Fibroid surgery and future pregnancy planning also reviewed, including likely need for early  section depending on procedure chosen. Fertility and risk of impairing fertility based on each procedure also reviewed extensively.  Of note, due to the size of her uterus, I discussed that she would likely need a vertical skin incision for appropriate access to her uterus and fibroids. Details of the procedure were discussed with the patient.  Risks include, but are not  limited to, bleeding, infection, and injury to surrounding organs such as the bowel, urinary system, nerves, and blood vessels.  Injury may result in repair at the time of the surgery or in a separate procedure.   After discussing her options, patient is requesting open myomectomy and has previously declined a second opinion.  Case request placed.      15 minutes spent on the date of the encounter doing chart review, history and exam, documentation and further activities as noted above    Visit completed today with  services.    Amarilys Griffin [facility]

## 2023-11-29 NOTE — TELEPHONE ENCOUNTER
[facility] SURGERY PLANNING/SCHEDULING WORKSHEET                                                     Bertha Willard                :  1990  MRN:  [id number]  Home Phone [phone]   Work Phone Not on file.   Mobile [phone]         Surgeon: Amarilys Griffin DO    DIAGNOSIS:   Symptomatic fibroid uterus    SURGICAL PROCEDURE:  Abdominal myomectomy    Surgery Location:  [facility]  Patient Surgery Class:  Admission with overnight stay of 2 days  Length of Procedure:  120 minutes  Type of anesthesia:  General and RUFINA block    Multi-surgeon case: Dr. Rosenberg or Stefanie to assist- -  OR Assistant needed:   Yes  Vendor needed: No  Positioning:  See Preference Card  Laterality:  NA  Date requested:   when possible     Special Equipment: Ligassure  Special Instructions for patient:  Per the [facility] Pre-Admission Nurse when they call the patient prior to the surgery date.   Precautions:  NONE  :  [place]    Sterilization consent:  Not applicable to procedure being performed.    Preop: Pre-op options: PCP  Pre-surgery consult needed:  Not applicable.  Postop evaluation needed:  2 weeks    ALLERGIES: No Known Allergies   BMI:There is no height or weight on file to calculate BMI.   Amarilys Griffin DO    2023

## 2023-11-30 ENCOUNTER — TELEPHONE (OUTPATIENT)
Dept: OBGYN | Facility: CLINIC | Age: 33
End: 2023-11-30
Payer: COMMERCIAL

## 2023-11-30 NOTE — TELEPHONE ENCOUNTER
[facility] SURGERY PLANNING/SCHEDULING WORKSHEET                                                     Bertha Willard                :  1990  MRN:  [id number]  Home Phone [phone]   Work Phone Not on file.   Mobile [phone]         Surgeon: Amarilys Griffin DO     DIAGNOSIS:   Symptomatic fibroid uterus     SURGICAL PROCEDURE:  Abdominal myomectomy     Surgery Location:  [facility]  Patient Surgery Class:  Admission with overnight stay of 2 days  Length of Procedure:  120 minutes  Type of anesthesia:  General and RUFINA block     Multi-surgeon case: Dr. Rosenberg or Stefanie to assist- -  OR Assistant needed:   Yes  Vendor needed: No  Positioning:  See Preference Card  Laterality:  NA  Date requested:   when possible      Special Equipment: Ligassure  Special Instructions for patient:  Per the [facility] Pre-Admission Nurse when they call the patient prior to the surgery date.   Precautions:  NONE  :  [place]     Sterilization consent:  Not applicable to procedure being performed.     Preop: Pre-op options: PCP  Pre-surgery consult needed:  Not applicable.  Postop evaluation needed:  2 weeks     ALLERGIES: No Known Allergies   BMI:There is no height or weight on file to calculate BMI.   Amarilys Griffin DO    2023   SURGERY SCHEDULING AND PRECERTIFICATION    Medical Record Number: [id number]  Bertha Willard  YOB: 1990   Phone: [phone] (home)   Primary Provider: No Ref-Primary, Physician    Reason for Admit:  ICD-10 CODE:  D25.2    Surgeon: Amarilys Griffin DO  Surgical Procedure: Abdominal myomectomy    Date of Surgery  Time of Surgery 12:30pm  Surgery to be performed at:  [facility]  Status: Inpatient- Length of stay:  2 days.  Type of Anesthesia Anticipated: General & RUFINA Block    Sterilization consent:  Not applicable to procedure being performed.    Pre-Op: On  with Ivania Najera at [facility] testing:  Per Provider's discretion  Covid testing is not indicated.     Post-Op:  2 weeks on 02/20 with Dr Griffin at [place]    Pre-certification routed to Financial Counselors:  Yes    Surgery packet mailed to patient's home address: Yes  Patient instructed NPO 12 hours prior to surgery, arrive 1.5 hours  prior to surgery, must have a .  Patient understood and agrees to the plan.      Requestor:  Ada Romero     Location:  [id number]

## 2023-12-20 NOTE — TELEPHONE ENCOUNTER
PB DOS: 2/1/24 IP 2 days   Type of Procedure: Abdominal myomectomy  CPT Codes: 59782  ICD10 Codes: D25.2  Surgeon/Ordering provider: Amarilys Griffin DO  Pre-cert/Authorization completed:  no PA required, Hospital is required to notify [facility] with-in 24 hours of admission   Payer: [employer]   Spoke to Jodi LINWOOD   Ref. # [id number]/ Auth #   Valid Dates:   Form faxed to [facility]    Please advise the patient to contact their insurance for coverage and benefits. Prior authorization is not a guarantee of payment. Payment is based on the patient's benefit plan documents.    *Taylor PALUMBO from [facility] states pt termed the same day as the plan was activated 1/1/2023. States member was not eligible.

## 2024-01-18 NOTE — PATIENT INSTRUCTIONS
Preparing for Your Surgery  Getting started  A nurse will call you to review your health history and instructions. They will give you an arrival time based on your scheduled surgery time. Please be ready to share:  Your doctor's clinic name and phone number  Your medical, surgical, and anesthesia history  A list of allergies and sensitivities  A list of medicines, including herbal treatments and over-the-counter drugs  Whether the patient has a legal guardian (ask how to send us the papers in advance)  Please tell us if you're pregnant--or if there's any chance you might be pregnant. Some surgeries may injure a fetus (unborn baby), so they require a pregnancy test. Surgeries that are safe for a fetus don't always need a test, and you can choose whether to have one.   If you have a child who's having surgery, please ask for a copy of Preparing for Your Child's Surgery.    Preparing for surgery  Within 10 to 30 days of surgery: Have a pre-op exam (sometimes called an H&P, or History and Physical). This can be done at a clinic or pre-operative center.  If you're having a , you may not need this exam. Talk to your care team.  At your pre-op exam, talk to your care team about all medicines you take. If you need to stop any medicines before surgery, ask when to start taking them again.  We do this for your safety. Many medicines can make you bleed too much during surgery. Some change how well surgery (anesthesia) drugs work.  Call your insurance company to let them know you're having surgery. (If you don't have insurance, call [phone].)  Call your clinic if there's any change in your health. This includes signs of a cold or flu (sore throat, runny nose, cough, rash, fever). It also includes a scrape or scratch near the surgery site.  If you have questions on the day of surgery, call your hospital or surgery center.  Eating and drinking guidelines  For your safety: Unless your surgeon tells you otherwise,  follow the guidelines below.  Eat and drink as usual until 8 hours before you arrive for surgery. After that, no food or milk.  Drink clear liquids until 2 hours before you arrive. These are liquids you can see through, like water, Gatorade, and Propel Water. They also include plain black coffee and tea (no cream or milk), candy, and breath mints. You can spit out gum when you arrive.  If you drink alcohol: Stop drinking it the night before surgery.  If your care team tells you to take medicine on the morning of surgery, it's okay to take it with a sip of water.  Preventing infection  Shower or bathe the night before and morning of your surgery. Follow the instructions your clinic gave you. (If no instructions, use regular soap.)  Don't shave or clip hair near your surgery site. We'll remove the hair if needed.  Don't smoke or vape the morning of surgery. You may chew nicotine gum up to 2 hours before surgery. A nicotine patch is okay.  Note: Some surgeries require you to completely quit smoking and nicotine. Check with your surgeon.  Your care team will make every effort to keep you safe from infection. We will:  Clean our hands often with soap and water (or an alcohol-based hand rub).  Clean the skin at your surgery site with a special soap that kills germs.  Give you a special gown to keep you warm. (Cold raises the risk of infection.)  Wear special hair covers, masks, gowns and gloves during surgery.  Give antibiotic medicine, if prescribed. Not all surgeries need antibiotics.  What to bring on the day of surgery  Photo ID and insurance card  Copy of your health care directive, if you have one  Glasses and hearing aids (bring cases)  You can't wear contacts during surgery  Inhaler and eye drops, if you use them (tell us about these when you arrive)  CPAP machine or breathing device, if you use them  A few personal items, if spending the night  If you have . . .  A pacemaker, ICD (cardiac defibrillator) or other  implant: Bring the ID card.  An implanted stimulator: Bring the remote control.  A legal guardian: Bring a copy of the certified (court-stamped) guardianship papers.  Please remove any jewelry, including body piercings. Leave jewelry and other valuables at home.  If you're going home the day of surgery  You must have a responsible adult drive you home. They should stay with you overnight as well.  If you don't have someone to stay with you, and you aren't safe to go home alone, we may keep you overnight. Insurance often won't pay for this.  After surgery  If it's hard to control your pain or you need more pain medicine, please call your surgeon's office.  Questions?   If you have any questions for your care team, list them here: _________________________________________________________________________________________________________________________________________________________________________ ____________________________________ ____________________________________ ____________________________________  For informational purposes only. Not to replace the advice of your health care provider. Copyright   2003, 2019 [employer]. All rights reserved. Clinically reviewed by Clemencia Abraham MD. SMARTworks 825321 - REV 12/22.    How to Take Your Medication Before Surgery                                                         If you have any questions regarding your visit, Please contact your care team.     [email or web address]: [phone]  To Schedule an Appointment 24/7  Call: [phone]     Ivania Dania- APRN CNP      Fariba Edwards-Surgery Scheduler  Yessica-Surgery Scheduler         Monday 7:30am-2:00pm    Tuesday 7:30am-4:00pm    Wednesday 7:30am-2:00pm    Thursday 7:30am-11:00am    Friday 7:30am-2:00pm [facility]  [address] [place] [address]  Phone- [phone]   Fax-  [phone]     Imaging Scheduling all locations  [phone]    [facility] Labor and Delivery  [address] [place] [address] [place] [address]  [phone]         Urgent Care locations:  [facility]   Monday-Friday  10 am - 8 pm  Saturday and Sunday   9 am - 5 pm     [phone]   If you need a medication refill, please contact your pharmacy. Please allow 3 business days for your refill to be completed.  As always, Thank you for trusting us with your healthcare needs!      see additional instructions from your care team below

## 2024-01-18 NOTE — PROGRESS NOTES
Preoperative Evaluation  [facility]  [address] [address]  Phone: [phone]  Primary Provider: No Ref-Primary, Physician  Pre-op Performing Provider:    HAMILTON MARIE  TELEPHONE,   Jan 19, 2024     Visit today conducted with aid of telephone interpretor.    Bertha is a 33 year old, presenting for the following:  Pre-Op Exam    Surgical Information  Surgery/Procedure: Abdominal myomectomy   Surgery Location: [facility]   Surgeon: Amarilys Griffin DO   Surgery Date:  02/01 /24  Time of Surgery:  12:30pm   Fax number for surgical facility: [phone]    Assessment & Plan     The proposed surgical procedure is considered INTERMEDIATE risk.    Preop general physical exam  Planning abdominal myomectomy    Uterine leiomyoma, unspecified location  Planning abdominal myomectomy       - No identified additional risk factors other than previously addressed    Antiplatelet or Anticoagulation Medication Instructions   - Patient is on no antiplatelet or anticoagulation medications.    Additional Medication Instructions  Discussed with patient     Recommendation  APPROVAL GIVEN to proceed with proposed procedure, without further diagnostic evaluation.    Subjective       HPI related to upcoming procedure: Patient with a fibroid uterus-largest measuring nearly 13 cm, opting for myomectomy to preserve fertility options.          1/19/2024     8:03 AM   Preop Questions   1. Have you ever had a heart attack or stroke? No   2. Have you ever had surgery on your heart or blood vessels, such as a stent placement, a coronary artery bypass, or surgery on an artery in your head, neck, heart, or legs? No   3. Do you have chest pain with activity? No   4. Do you have a history of  heart failure? No   5. Do you currently have a cold, bronchitis or symptoms of other infection? No   6. Do you have a cough, shortness of breath, or wheezing? No   7. Do you or anyone in  your family have previous history of blood clots? No   8. Do you or does anyone in your family have a serious bleeding problem such as prolonged bleeding following surgeries or cuts? No   9. Have you ever had problems with anemia or been told to take iron pills? No   10. Have you had any abnormal blood loss such as black, tarry or bloody stools, or abnormal vaginal bleeding? No   11. Have you ever had a blood transfusion? No   12. Are you willing to have a blood transfusion if it is medically needed before, during, or after your surgery? NO - Would decline   13. Have you or any of your relatives ever had problems with anesthesia? No   14. Do you have sleep apnea, excessive snoring or daytime drowsiness? No   15. Do you have any artifical heart valves or other implanted medical devices like a pacemaker, defibrillator, or continuous glucose monitor? No   16. Do you have artificial joints? No   17. Are you allergic to latex? No   18. Is there any chance that you may be pregnant? No     Preoperative Review of    reviewed - no record of controlled substances prescribed.      Status of Chronic Conditions:  See problem list for active medical problems.  Problems all longstanding and stable, except as noted/documented.  See ROS for pertinent symptoms related to these conditions.    Patient Active Problem List    Diagnosis Date Noted    Uterine leiomyoma, unspecified location 06/15/2023     Priority: Medium    Cervical high risk HPV (human papillomavirus) test positive 05/30/2023     Priority: Medium     5/30/23 NIL pap, +HR HPV (not 16/18). Plan: cotest in 1 year  6/5/23 left message / [employer] sent / [employer] read        Past Medical History:   Diagnosis Date    No pertinent past medical history      Past Surgical History:   Procedure Laterality Date    NO HISTORY OF SURGERY       Current Outpatient Medications   Medication Sig Dispense Refill    bisacodyl (DULCOLAX) 5 MG EC tablet Take 1 tablet (5 mg) by mouth daily  "as needed for constipation 60 tablet 1    polyethylene glycol (MIRALAX) 17 g packet Take 17 g by mouth      SENNA-docusate sodium (SENNA S) 8.6-50 MG tablet Take 1 tablet by mouth At Bedtime 60 tablet 0       No Known Allergies     Social History     Tobacco Use    Smoking status: Never    Smokeless tobacco: Never   Substance Use Topics    Alcohol use: Never     History   Drug Use Unknown         Objective    /84 (BP Location: Right arm, Patient Position: Sitting, Cuff Size: Adult Large)   Pulse 88   Ht 1.765 m (5' 9.5\")   Wt 108.4 kg (239 lb)   LMP 01/10/2024 (Exact Date)   SpO2 100%   BMI 34.79 kg/m     Estimated body mass index is 34.79 kg/m  as calculated from the following:    Height as of this encounter: 1.765 m (5' 9.5\").    Weight as of this encounter: 108.4 kg (239 lb).  Review of Systems  CONSTITUTIONAL: NEGATIVE for fever, chills, change in weight  INTEGUMENTARY/SKIN: NEGATIVE for worrisome rashes, moles or lesions  EYES: NEGATIVE for vision changes or irritation  ENT/MOUTH: NEGATIVE for ear, mouth and throat problems  RESP: NEGATIVE for significant cough or SOB  CV: NEGATIVE for chest pain, palpitations or peripheral edema  GI: NEGATIVE for nausea, abdominal pain, heartburn, or change in bowel habits  : NEGATIVE for frequency, dysuria, or hematuria  MUSCULOSKELETAL: NEGATIVE for significant arthralgias or myalgia  NEURO: NEGATIVE for weakness, dizziness or paresthesias  ENDOCRINE: NEGATIVE for temperature intolerance, skin/hair changes  HEME: NEGATIVE for bleeding problems  PSYCHIATRIC: NEGATIVE for changes in mood or affect  Physical Exam  GENERAL: alert and no distress  EYES: Eyes grossly normal to inspection, PERRL and conjunctivae and sclerae normal  HENT: ear canals and TM's normal, nose and mouth without ulcers or lesions  NECK: no adenopathy, no asymmetry, masses, or scars  RESP: lungs clear to auscultation - no rales, rhonchi or wheezes  CV: regular rate and rhythm, normal S1 S2, no " S3 or S4, no murmur, click or rub, no peripheral edema  ABDOMEN: soft, nontender, no hepatosplenomegaly, no masses and bowel sounds normal  MS: no gross musculoskeletal defects noted, no edema  SKIN: no suspicious lesions or rashes  NEURO: Normal strength and tone, mentation intact and speech normal  PSYCH: mentation appears normal, affect normal/bright  LYMPH: no cervical, supraclavicular, axillary, or inguinal adenopathy    Revised Cardiac Risk Index (RCRI)  The patient has the following serious cardiovascular risks for perioperative complications:   - No serious cardiac risks = 0 points     RCRI Interpretation: 0 points: Class I (very low risk - 0.4% complication rate)    Signed Electronically by: NANDO Leung CNP  Copy of this evaluation report is provided to requesting physician.

## 2024-01-19 ENCOUNTER — OFFICE VISIT (OUTPATIENT)
Dept: OBGYN | Facility: CLINIC | Age: 34
End: 2024-01-19
Payer: COMMERCIAL

## 2024-01-19 VITALS
BODY MASS INDEX: 34.22 KG/M2 | HEIGHT: 70 IN | DIASTOLIC BLOOD PRESSURE: 84 MMHG | WEIGHT: 239 LBS | HEART RATE: 88 BPM | OXYGEN SATURATION: 100 % | SYSTOLIC BLOOD PRESSURE: 133 MMHG

## 2024-01-19 DIAGNOSIS — D25.9 UTERINE LEIOMYOMA, UNSPECIFIED LOCATION: ICD-10-CM

## 2024-01-19 DIAGNOSIS — Z01.818 PREOP GENERAL PHYSICAL EXAM: Primary | ICD-10-CM

## 2024-01-19 PROCEDURE — 99212 OFFICE O/P EST SF 10 MIN: CPT | Performed by: NURSE PRACTITIONER

## 2024-01-19 NOTE — LETTER
January 19, 2024      Bertha Willard  [address] [address]        To Whom It May Concern,       Bertha is scheduled to undergo surgery on February 1, 2024.           Sincerely,        NANDO Leung CNP

## 2024-01-22 ENCOUNTER — TELEPHONE (OUTPATIENT)
Dept: OBGYN | Facility: CLINIC | Age: 34
End: 2024-01-22

## 2024-01-22 NOTE — TELEPHONE ENCOUNTER
Reason for Call:  Form, our goal is to have forms completed with 72 hours, however, some forms may require a visit or additional information.    Type of letter, form or note:  medical    Who is the form from?: Patient    Where did the form come from: Patient or family brought in       What clinic location was the form placed at?: [place]    Where the form was placed: Given to MA/RN    What number is listed as a contact on the form?: [phone]        Additional comments: Forms for OB    Call taken on 1/22/2024 at 4:24 PM by Pamela Vila

## 2024-01-22 NOTE — TELEPHONE ENCOUNTER
Forms/Letter Request    Type of form/letter: OTHER:       Do we have the form/letter: Yes:     Who is the form from? Patient    Where did/will the form come from? Patient or family brought in       When is form/letter needed by: ASAP    How would you like the form/letter returned:     Patient Notified form requests are processed in 5-7 business days:Yes    Could we send this information to you in [employer] [facility] [facility] or would you prefer to receive a phone call?:   Patient would prefer a phone call   Okay to leave a detailed message?: Yes at Cell number on file:    Telephone Information:   Mobile [phone]    South African  needed, Pt requested OB provider fill out forms.

## 2024-01-23 NOTE — TELEPHONE ENCOUNTER
Patient has been scheduled with provider 2/20/2024. Forwarding message to JOSE Titus.    Derick Loo CMA 1/23/2024 9:15 AM

## 2024-01-23 NOTE — TELEPHONE ENCOUNTER
Form placed in Amarilys Griffin's basket at [place].Cynthia Terrell [facility]

## 2024-01-25 ENCOUNTER — MYC MEDICAL ADVICE (OUTPATIENT)
Dept: OBGYN | Facility: CLINIC | Age: 34
End: 2024-01-25
Payer: COMMERCIAL

## 2024-02-07 ENCOUNTER — TELEPHONE (OUTPATIENT)
Dept: OBGYN | Facility: CLINIC | Age: 34
End: 2024-02-07
Payer: COMMERCIAL

## 2024-02-07 NOTE — TELEPHONE ENCOUNTER
[facility] Call Center    Phone Message    May a detailed message be left on voicemail: yes     Reason for Call: Other: Evelyn from [employer] is calling to verify information about a surgery on 2/1/2024. Evelyn's phone number is [phone]. [phone]. She states her voicemail is confidential. Please call to discuss.     Action Taken: Other: OBGYN    Travel Screening: Not Applicable

## 2024-02-14 NOTE — PATIENT INSTRUCTIONS
If you have any questions regarding your visit, Please contact your care team.    [facility]: [phone]      [facility] CLINIC HOURS TELEPHONE NUMBER   Amarilys Griffin DO.    JOSE Marrero-Surgery Scheduler  Yessica - Surgery Scheduler    JO ANN Handy, JO ANN Beard RN     Monday, Thursday  [place]  7am-3pm    Tuesday, Wednesday  [place]  7am-3pm    Friday  [place]  1pm-3:30pm    Typical Surgery Days: Thursday or Friday   [facility]  [address]  [place], [place] [address] [phone] Phone  [phone] Fax    [facility]  [address] [place] [address] [phone] Phone    Imaging [phone] Phone    [facility] Labor and Delivery:  [phone] Phone     **Surgeries** Our Surgery Schedulers will contact you to schedule. If you do not receive a call within 3 business days, please call [phone].    Urgent Care locations:  [facility] Saturday and    9 am - 5 pm    Monday-Friday   12 pm - 8 pm  Saturday and    9 am - 5 pm   [phone]       If you need a medication refill, please contact your pharmacy. Please allow 3 business days for your refill to be completed.  As always, Thank you for trusting us with your healthcare needs!

## 2024-02-20 ENCOUNTER — OFFICE VISIT (OUTPATIENT)
Dept: OBGYN | Facility: CLINIC | Age: 34
End: 2024-02-20
Payer: COMMERCIAL

## 2024-02-20 VITALS
WEIGHT: 239 LBS | DIASTOLIC BLOOD PRESSURE: 76 MMHG | HEART RATE: 79 BPM | BODY MASS INDEX: 34.79 KG/M2 | SYSTOLIC BLOOD PRESSURE: 127 MMHG

## 2024-02-20 DIAGNOSIS — Z98.890 S/P MYOMECTOMY: ICD-10-CM

## 2024-02-20 DIAGNOSIS — Z98.890 POSTOPERATIVE STATE: Primary | ICD-10-CM

## 2024-02-20 PROCEDURE — 99024 POSTOP FOLLOW-UP VISIT: CPT | Performed by: OBSTETRICS & GYNECOLOGY

## 2024-02-20 NOTE — PROGRESS NOTES
SUBJECTIVE:       HPI: Bertha Willard is a 33 year old  is s/p abdominal myomectomy without endometrial involvement on 24 for enlarged, symptomatic fibroid uterus. Since surgery, she has been doing well.  No new concerns.    Gyn Hx: Patient's last menstrual period was 2024 (exact date).     Last pap was 23 NIL +HR HPV Other. Next pap due 2024         Today's PHQ-2 Score:       2024     8:03 AM   PHQ-2 (  [employer])   Q1: Little interest or pleasure in doing things 0   Q2: Feeling down, depressed or hopeless 0   PHQ-2 Score 0   Q1: Little interest or pleasure in doing things Not at all   Q2: Feeling down, depressed or hopeless Not at all   PHQ-2 Score 0     Today's PHQ-9 Score:        No data to display              Today's VIJAY-7 Score:        No data to display                Problem list and histories reviewed & adjusted, as indicated.  Additional history: as documented.    Patient Active Problem List   Diagnosis    Cervical high risk HPV (human papillomavirus) test positive    Uterine leiomyoma, unspecified location     Past Surgical History:   Procedure Laterality Date    NO HISTORY OF SURGERY        Social History     Tobacco Use    Smoking status: Never    Smokeless tobacco: Never   Substance Use Topics    Alcohol use: Never      No data available              bisacodyl (DULCOLAX) 5 MG EC tablet, Take 1 tablet (5 mg) by mouth daily as needed for constipation (Patient not taking: Reported on 2024)  polyethylene glycol (MIRALAX) 17 g packet, Take 17 g by mouth (Patient not taking: Reported on 2024)  SENNA-docusate sodium (SENNA S) 8.6-50 MG tablet, Take 1 tablet by mouth At Bedtime (Patient not taking: Reported on 2024)    No current facility-administered medications on file prior to visit.    No Known Allergies    ROS:  10 Point review of systems negative other noted above in HPI    OBJECTIVE:     /76   Pulse 79   Wt 108.4 kg (239 lb)   LMP 2024 (Exact  Date)   BMI 34.79 kg/m    Body mass index is 34.79 kg/m .      Gen: Alert, oriented, appropriately interactive, NAD  Chest: Symmetrical, unlabored breathing  Abdomen: soft, non tender, non distended, no masses, low vertical skin incision c/d/i        In-Clinic Test Results:  No results found for this or any previous visit (from the past 24 hour(s)).    Path  Final Diagnosis    Uterine tissue, myomectomy:Leiomyomata.   Electronically signed by Zay Foote Jr., MD on 2024 at  1:16 PM   Gross Description    Uterine fibroids x13 is a 859 g, 17.2 x 13.5 x 9 cm aggregate of pink-tan nodules. Sections show a white, firm, whorled cut surface. No focal areas of necrosis or calcification are noted. Representative tissue submitted in 5 blocks. MS       ASSESSMENT/PLAN:                                                      Bertha Willard is a 33 year old  s/p abdominal myomectomy without endometrial involvement on 24 for enlarged, symptomatic fibroid uterus.      ICD-10-CM    1. Postoperative state  Z98.890       2. S/P myomectomy  Z98.890           Doing well postoperatively.     Cleared for routine to normal activity, including intercourse.     Contraception - Discussed waiting minimum of 3 months before trying to conceive. Patient counseled of need for early  section with any future pregnancy.   Pap due 2024.        Amarilys Griffin DO  [facility]

## 2024-02-21 ENCOUNTER — MYC MEDICAL ADVICE (OUTPATIENT)
Dept: OBGYN | Facility: CLINIC | Age: 34
End: 2024-02-21
Payer: COMMERCIAL

## 2024-02-21 ENCOUNTER — TELEPHONE (OUTPATIENT)
Dept: OBGYN | Facility: CLINIC | Age: 34
End: 2024-02-21
Payer: COMMERCIAL

## 2024-02-21 NOTE — TELEPHONE ENCOUNTER
Forms/Letter Request    Type of form/letter: Disability      Do we have the form/letter: Yes:     Who is the form from? Patient    Where did/will the form come from? Patient or family brought in       When is form/letter needed by: ASAP    How would you like the form/letter returned:     Patient Notified form requests are processed in 5-7 business days:Yes    Could we send this information to you in [facility] or would you prefer to receive a phone call?:   Patient would prefer a phone call   Okay to leave a detailed message?: Yes at Home number on file [phone] (home) or Cell number on file:    Telephone Information:   Mobile [phone]

## 2024-02-22 NOTE — TELEPHONE ENCOUNTER
[place] form scanned to [facility] and moved to Dr. Griffin's Folder.     Sultana Nails CMA

## 2024-02-27 NOTE — TELEPHONE ENCOUNTER
[facility] Call Center    Phone Message    May a detailed message be left on voicemail: yes     Reason for Call: Other: Pt is calling because she picked up her form to return to work but it says March and she needed it to say April (the original date) Pt is wanting to  today or tomorrow. Please call Pt to discuss     Action Taken: Other: AN OB    Travel Screening: Not Applicable

## 2024-02-27 NOTE — TELEPHONE ENCOUNTER
Patient dropped off forms from 1/22/24, however one set states she can return to work on 3/14/24, and the set sent to her employer states 4/14/24. She would like them corrected please.

## 2024-02-28 ENCOUNTER — TELEPHONE (OUTPATIENT)
Dept: OBGYN | Facility: CLINIC | Age: 34
End: 2024-02-28
Payer: COMMERCIAL

## 2024-02-28 NOTE — TELEPHONE ENCOUNTER
Patient dropped off form for provider to make changes necessary to form   Placed in providers basket to review/complete  Thank you,  Koki GASCA    [phone]

## 2024-02-28 NOTE — TELEPHONE ENCOUNTER
Received release of info signed by patient from [employer].  Requesting 2/20/24 appointment notes.    A copy of visit was faxed to [employer].    Release of info will be sent to scan. Copy in  OB rober.  JOSE Banuelos 2/28/2024

## 2024-02-28 NOTE — TELEPHONE ENCOUNTER
Forms have been corrected and date to return to work is stated as 4/25/24. Sent [employer] message to patient stating the forms are ready for  and located the the  in [place] clinic location. Closing encounter now.    Derick Loo CMA 2/28/2024 12:08 PM

## 2024-02-28 NOTE — TELEPHONE ENCOUNTER
[facility] Call Center    Phone Message    May a detailed message be left on voicemail: yes     Reason for Call: Other: . Evelyn with [employer] is calling, she just sent a form to request last office visit notes from 2/20/24- FYI, please fax to [phone], thank you    Action Taken: Message routed to:  Other: obgyn    Travel Screening: Not Applicable

## 2024-03-06 ENCOUNTER — TELEPHONE (OUTPATIENT)
Dept: OBGYN | Facility: CLINIC | Age: 34
End: 2024-03-06
Payer: COMMERCIAL

## 2024-03-06 NOTE — TELEPHONE ENCOUNTER
Evelyn from [employer] calling to confirm date patient is cleared to go back to work.  Provided info that the forms were corrected and date to return to work is states as 4/25/2024- no other info given.     Attempted to call Evelyn back.- she was unavailable spoke with other agent.       Per chart review TE encounter 2/21/2024 states:   Forms have been corrected and date to return to work is stated as 4/25/24        Release on file for [employer] 2/21/2024  Re: claim number [id number]    Barb GASCA RN

## 2024-03-06 NOTE — TELEPHONE ENCOUNTER
[facility] Call Center    Phone Message    May a detailed message be left on voicemail: yes     Reason for Call: Other: Caller is calling because the provider sent in office notice stating that the patient is clear to go back to normal activities but then the provider sent in a form stating that she needed to stay out of work until April 25th. Caller is calling to get an update as to if the patient is cleared and if not the reason why the patient cannot go back to work. Please call her back to discuss.      Action Taken: Other: obgyn    Travel Screening: Not Applicable

## 2024-03-21 ENCOUNTER — TELEPHONE (OUTPATIENT)
Dept: OBGYN | Facility: CLINIC | Age: 34
End: 2024-03-21
Payer: COMMERCIAL

## 2024-03-21 NOTE — TELEPHONE ENCOUNTER
Dr. Degroot calling regarding reviewing disability claim from pts time off work following 2/1- abdominal myomectomy with Dr. Elias Degroot requesting call back at [phone] to confirm why pt was noted to be off work on 4/25/2024 as filled out on her disability paperwork but standard recovery time is 6-8 weeks.    Per 2/20/2024 post op visit, pt was cleared for normal activity including intercourse.    RN routing to provider to advise.    Kisha Trejo RN on 3/21/2024 at 1:41 PM

## 2024-03-22 NOTE — TELEPHONE ENCOUNTER
Dr. Degroot called back and RN relayed Dr. Ash statement.    Kisha Trejo RN on 3/22/2024 at 12:10 PM

## 2024-03-22 NOTE — TELEPHONE ENCOUNTER
Amarilys Griffin DO  [facility] minutes ago (9:18 AM)     KK  Due to large incision and surgery, patient requested more time off from her job as she did not yet feel ready to return to employment.    Amarilys Griffin DO     Unable to reach Dr. Degroot via phone. RN left a message and instructed patient to call the clinic at [phone].    Kisha Trejo RN on 3/22/2024 at 10:06 AM

## 2024-05-09 ENCOUNTER — PATIENT OUTREACH (OUTPATIENT)
Dept: OBGYN | Facility: CLINIC | Age: 34
End: 2024-05-09
Payer: COMMERCIAL

## 2024-05-09 DIAGNOSIS — R87.810 CERVICAL HIGH RISK HPV (HUMAN PAPILLOMAVIRUS) TEST POSITIVE: Primary | ICD-10-CM

## 2024-07-28 ENCOUNTER — HEALTH MAINTENANCE LETTER (OUTPATIENT)
Age: 34
End: 2024-07-28

## 2024-08-13 NOTE — PATIENT INSTRUCTIONS
If you have any questions regarding your visit, Please contact your care team.    [facility]: [phone]      [facility] CLINIC HOURS TELEPHONE NUMBER   Amarilys Griffin DO.    JOSE Marrero-Surgery Scheduler  Yessica - Surgery Scheduler    JO ANN Handy RN Kylie, RN     Monday, Thursday  [place]  7am-3pm    Tuesday, Wednesday  [place]  7am-3pm    Friday  [place]  1pm-3:30pm    Typical Surgery Days: Thursday or Friday   [facility]  [address] [place] [address] [phone] Phone  [phone] Fax    [facility]  [address] [place] [address] [phone] Phone    Imaging [phone] Phone    [facility] Labor and Delivery:  [phone] Phone     **Surgeries** Our Surgery Schedulers will contact you to schedule. If you do not receive a call within 3 business days, please call [phone].    Urgent Care locations:  [facility] Saturday and    9 am - 5 pm    Monday-Friday   12 pm - 8 pm  Saturday and    9 am - 5 pm   [phone]       If you need a medication refill, please contact your pharmacy. Please allow 3 business days for your refill to be completed.  As always, Thank you for trusting us with your healthcare needs!

## 2024-08-14 ENCOUNTER — OFFICE VISIT (OUTPATIENT)
Dept: OBGYN | Facility: CLINIC | Age: 34
End: 2024-08-14
Payer: COMMERCIAL

## 2024-08-14 ENCOUNTER — TELEPHONE (OUTPATIENT)
Dept: OBGYN | Facility: CLINIC | Age: 34
End: 2024-08-14

## 2024-08-14 ENCOUNTER — MYC MEDICAL ADVICE (OUTPATIENT)
Dept: OBGYN | Facility: CLINIC | Age: 34
End: 2024-08-14

## 2024-08-14 VITALS
DIASTOLIC BLOOD PRESSURE: 88 MMHG | BODY MASS INDEX: 35.72 KG/M2 | WEIGHT: 245.4 LBS | SYSTOLIC BLOOD PRESSURE: 133 MMHG | HEART RATE: 85 BPM

## 2024-08-14 DIAGNOSIS — Z31.9 PATIENT DESIRES PREGNANCY: ICD-10-CM

## 2024-08-14 DIAGNOSIS — Z12.4 CERVICAL CANCER SCREENING: ICD-10-CM

## 2024-08-14 DIAGNOSIS — Z31.9 PATIENT DESIRES PREGNANCY: Primary | ICD-10-CM

## 2024-08-14 DIAGNOSIS — Z01.419 WELL WOMAN EXAM WITH ROUTINE GYNECOLOGICAL EXAM: Primary | ICD-10-CM

## 2024-08-14 DIAGNOSIS — Z98.890 HISTORY OF MYOMECTOMY: ICD-10-CM

## 2024-08-14 PROCEDURE — 99459 PELVIC EXAMINATION: CPT | Performed by: OBSTETRICS & GYNECOLOGY

## 2024-08-14 PROCEDURE — G0145 SCR C/V CYTO,THINLAYER,RESCR: HCPCS | Performed by: OBSTETRICS & GYNECOLOGY

## 2024-08-14 PROCEDURE — 99213 OFFICE O/P EST LOW 20 MIN: CPT | Mod: 25 | Performed by: OBSTETRICS & GYNECOLOGY

## 2024-08-14 PROCEDURE — 99395 PREV VISIT EST AGE 18-39: CPT | Performed by: OBSTETRICS & GYNECOLOGY

## 2024-08-14 PROCEDURE — 87624 HPV HI-RISK TYP POOLED RSLT: CPT | Performed by: OBSTETRICS & GYNECOLOGY

## 2024-08-14 NOTE — TELEPHONE ENCOUNTER
Pt has not yet read the [employer] message or called us back.  I am going to cancel the HSG and lab appts that were scheduled for pt for tomorrow morning since we aren't able to reach her.    Rozina Medel RN- OB/GYN group

## 2024-08-14 NOTE — TELEPHONE ENCOUNTER
Sent pt a [employer] message as it looks like she has read and responded to mychart in the past.    Pt has not yet read the [employer] message yet so I attempted to reach her by phone again using a Moroccan .    Unable to reach patient via phone. Left message to call back at [phone].    Rozina Medel RN-[facility]

## 2024-08-14 NOTE — TELEPHONE ENCOUNTER
Per Dr. Griffin pt needs a day #21 progesterone lab and an HSG.    Pt is currently on CD #5, LMP: 8/10.  Reached out to [facility] in  to see if they are able to get pt scheduled for an HSG this cycle.  They have booked pt for an HSG on 8/15 (the only opening they have during pt's window).    Attempted to reach pt to review the need for a progesterone level on day #21 of her cycle and help her schedule a lab only appt on day #21. As well as ask if she is able to do the HSG tomorrow, 8/15, at 8 am and go through instructions for that.  Pt will need to arrive at 7:15 am for a pregnancy test (schedule lab appt for this and order urine UPT).    Unable to reach patient via phone. Left message to call back at [phone].    Rozina Medel RN- OB/GYN group

## 2024-08-14 NOTE — PROGRESS NOTES
SUBJECTIVE:       HPI: Bertha Willard is a 33 year old  who presents today for WWE, pap smear.    S/p abdominal myomectomy without endometrial involvement on 24 for enlarged, symptomatic fibroid uterus.  Since surgery, has been doing well.  Regular cycles, would like to visit partner in [place] to TTC  Would like to check that she is ovulating and tubal patency as discussed previously    Gyn Hx: Patient's last menstrual period was 08/10/2024.    Patient is not sexually active.    Last pap was 23 NIL +HR HPV   STI history - none  Using none for contraception.   STD testing offered?  Declined   Menses every 28 days. reg flow.         reports that she has never smoked. She has never used smokeless tobacco.      Today's PHQ-2 Score:       2024     8:03 AM   PHQ-2 (  [employer])   Q1: Little interest or pleasure in doing things 0   Q2: Feeling down, depressed or hopeless 0   PHQ-2 Score 0   Q1: Little interest or pleasure in doing things Not at all   Q2: Feeling down, depressed or hopeless Not at all   PHQ-2 Score 0     Today's PHQ-9 Score:        No data to display              Today's VIJAY-7 Score:        No data to display                Problem list and histories reviewed & adjusted, as indicated.  Additional history: as documented.    Patient Active Problem List   Diagnosis    Cervical high risk HPV (human papillomavirus) test positive    Uterine leiomyoma, unspecified location     Past Surgical History:   Procedure Laterality Date    NO HISTORY OF SURGERY        Social History     Tobacco Use    Smoking status: Never    Smokeless tobacco: Never   Substance Use Topics    Alcohol use: Never      No data available              Current Outpatient Medications   Medication Sig Dispense Refill    bisacodyl (DULCOLAX) 5 MG EC tablet Take 1 tablet (5 mg) by mouth daily as needed for constipation (Patient not taking: Reported on 2024) 60 tablet 1    polyethylene glycol (MIRALAX) 17 g packet Take 17 g  by mouth (Patient not taking: Reported on 2/20/2024)      SENNA-docusate sodium (SENNA S) 8.6-50 MG tablet Take 1 tablet by mouth At Bedtime (Patient not taking: Reported on 2/20/2024) 60 tablet 0     No current facility-administered medications for this visit.     No Known Allergies    ROS:  10 Point review of systems negative other noted above in HPI    OBJECTIVE:     /88   Pulse 85   Wt 111.3 kg (245 lb 6.4 oz)   LMP 08/10/2024   BMI 35.72 kg/m    Body mass index is 35.72 kg/m .      Gen: Alert, oriented, appropriately interactive, NAD  Eyes: Eyes grossly normal to inspection, conjunctivae and sclerae normal  CV: No edema  Resp: no audible wheeze, cough, or visible cyanosis.  No visible retractions or increased work of breathing.  Able to speak fully in complete sentences.  Breasts: no axillary adenopathy, no dominant masses, no skin changes, no nipple discharge, nontender  Abdomen: soft, non tender, non distended, no masses, no hernias. No inguinal lymphadenopathy.   External genitalia: no lesions; normal appearing external genitalia, bartholins glands, urethra, skenes glands  Vagina: no masses or lesions or discharge, normally rugated.  Cervix: no masses or lesions or discharge   Bimanual exam:   Nontender pelvic floor muscles  Urethra: nontender   Bladder: nontender and without massess, well supported   Uterus: midline, anteverted, 12cm globular uterus, mobile  no masses, non-tender  Adnexa: no masses or tenderness appreciated   No cervical motion tenderness  MSK: normal gait, symmetric movements UE & LE  Lower extremities: non-tender, no edema  Neuro: Cranial nerves grossly intact, mentation intact and speech normal  Psych: mentation appears normal, affect normal/bright, judgement and insight intact, normal speech and appearance well-groomed      In-Clinic Test Results:  No results found for this or any previous visit (from the past 24 hour(s)).    ASSESSMENT/PLAN:                                                       Bertha Willard is a 33 year old  who presents today for WWE, fertility concerns      ICD-10-CM    1. Well woman exam with routine gynecological exam  Z01.419       2. Cervical cancer screening  Z12.4 Pap Screen with HPV - Recommended Age 30 - 65 Years      3. History of myomectomy  Z98.890           Pap with cotesting  STI screen- declined  Contraception- None    Fertility desired, planning to visit partner in [place]. Would like to check progesterone level to ensure ovulating and HSG recommended to ensure tubal patency after myomectomy. Orders placed.    Visit done today with Albanian .    Amarilys Griffin DO  [facility]

## 2024-08-15 ENCOUNTER — ANCILLARY PROCEDURE (OUTPATIENT)
Dept: GENERAL RADIOLOGY | Facility: CLINIC | Age: 34
End: 2024-08-15
Attending: OBSTETRICS & GYNECOLOGY
Payer: COMMERCIAL

## 2024-08-15 ENCOUNTER — LAB (OUTPATIENT)
Dept: LAB | Facility: CLINIC | Age: 34
End: 2024-08-15
Payer: COMMERCIAL

## 2024-08-15 DIAGNOSIS — Z98.890 HISTORY OF MYOMECTOMY: ICD-10-CM

## 2024-08-15 DIAGNOSIS — Z31.9 PATIENT DESIRES PREGNANCY: ICD-10-CM

## 2024-08-15 LAB
HCG UR QL: NEGATIVE
PROGEST SERPL-MCNC: 0.3 NG/ML

## 2024-08-15 PROCEDURE — 81025 URINE PREGNANCY TEST: CPT

## 2024-08-15 PROCEDURE — 58340 CATHETER FOR HYSTEROGRAPHY: CPT

## 2024-08-15 PROCEDURE — 36415 COLL VENOUS BLD VENIPUNCTURE: CPT

## 2024-08-15 PROCEDURE — 74740 X-RAY FEMALE GENITAL TRACT: CPT

## 2024-08-15 NOTE — TELEPHONE ENCOUNTER
Pt last seen yesterday for annual exam.    HSG completed today and progesterone day 21 lab scheduled for 8/30- pt asking for follow up appt after these are completed to review.    RN routing to provider if that appt can be a video visit or in person and if 15 or 30 min spot needed (next 30 min opening is on 9/11)

## 2024-08-15 NOTE — TELEPHONE ENCOUNTER
Received a message on teams this morning that patient had arrived for her HSG this morning but he noticed that I had canceled it.  I let him know that I had canceled it as I was not able to reach the pt via phone or [employer] yesterday before I left for the day and since her appt for the HSG was first thing this morning I didn't think she would have known about it and I didn't want it to be a no-show.  He stated the appt was still available and wanted to know if they should still do the HSG for the pt since she was there.  I advised for them to go ahead with the HSG on pt if able.      I did notice later that pt did eventually read the [employer] message about the HSG today and was able to see in her [place] the appointment time and info which is why she went to it.    Pt will still need to do a day #21 lab and I haven't been able to further discuss this with her but since I haven't been able to reach her by phone and she has been responding to [employer] I will continue to communicate with her via [employer].    Rozina Medel, RN-[facility]

## 2024-08-15 NOTE — TELEPHONE ENCOUNTER
Amarilys martin, DO  [facility] T[facility]1 minute ago (11:43 AM)     KK  Would she like to wait to see what the results are before deciding if she needs a visit? I am happy to see again but wonder if she really needs another visit. What are her concerns or are they related to possible results?    Thank you,  Amarilys Griffin, DO

## 2024-08-16 LAB
HPV HR 12 DNA CVX QL NAA+PROBE: NEGATIVE
HPV16 DNA CVX QL NAA+PROBE: NEGATIVE
HPV18 DNA CVX QL NAA+PROBE: NEGATIVE
HUMAN PAPILLOMA VIRUS FINAL DIAGNOSIS: NORMAL

## 2024-08-16 NOTE — TELEPHONE ENCOUNTER
Pt had the progesterone level drawn yesterday while she was at the lab getting a urine pregnancy test done prior to her HSG.  Yesterday was not day #21 of pt's cycle so this was drawn on the incorrect day.  Called [facility] and they will credit the lab to pt's account and I will enter a new lab order for when pt has her day #21 lab appt on 8/30.    Rozina Medel RN- OB/GYN group

## 2024-08-20 ENCOUNTER — PATIENT OUTREACH (OUTPATIENT)
Dept: OBGYN | Facility: CLINIC | Age: 34
End: 2024-08-20
Payer: COMMERCIAL

## 2024-08-20 LAB
BKR LAB AP GYN ADEQUACY: NORMAL
BKR LAB AP GYN INTERPRETATION: NORMAL
BKR LAB AP PREVIOUS ABNL DX: NORMAL
BKR LAB AP PREVIOUS ABNORMAL: NORMAL
PATH REPORT.COMMENTS IMP SPEC: NORMAL
PATH REPORT.COMMENTS IMP SPEC: NORMAL
PATH REPORT.RELEVANT HX SPEC: NORMAL

## 2024-08-30 ENCOUNTER — LAB (OUTPATIENT)
Dept: LAB | Facility: CLINIC | Age: 34
End: 2024-08-30
Payer: COMMERCIAL

## 2024-08-30 DIAGNOSIS — Z98.890 HISTORY OF MYOMECTOMY: ICD-10-CM

## 2024-08-30 DIAGNOSIS — Z31.9 PATIENT DESIRES PREGNANCY: ICD-10-CM

## 2024-08-30 LAB — PROGEST SERPL-MCNC: 13.5 NG/ML

## 2024-08-30 PROCEDURE — 36415 COLL VENOUS BLD VENIPUNCTURE: CPT

## 2024-08-30 PROCEDURE — 84144 ASSAY OF PROGESTERONE: CPT

## 2025-07-24 ENCOUNTER — PATIENT OUTREACH (OUTPATIENT)
Dept: OBGYN | Facility: CLINIC | Age: 35
End: 2025-07-24
Payer: COMMERCIAL